# Patient Record
Sex: FEMALE | Race: WHITE | NOT HISPANIC OR LATINO
[De-identification: names, ages, dates, MRNs, and addresses within clinical notes are randomized per-mention and may not be internally consistent; named-entity substitution may affect disease eponyms.]

---

## 2017-06-10 ENCOUNTER — HEALTH MAINTENANCE LETTER (OUTPATIENT)
Age: 27
End: 2017-06-10

## 2022-03-03 ENCOUNTER — OFFICE VISIT (OUTPATIENT)
Dept: VASCULAR SURGERY | Facility: CLINIC | Age: 32
End: 2022-03-03
Payer: COMMERCIAL

## 2022-03-03 DIAGNOSIS — L29.9 PRURITUS OF SKIN: Primary | ICD-10-CM

## 2022-03-03 PROCEDURE — 99203 OFFICE O/P NEW LOW 30 MIN: CPT | Performed by: SURGERY

## 2022-03-03 RX ORDER — PROPRANOLOL HYDROCHLORIDE 10 MG/1
TABLET ORAL
COMMUNITY
Start: 2021-10-28

## 2022-03-03 RX ORDER — FLUVOXAMINE MALEATE 100 MG
TABLET ORAL
COMMUNITY
Start: 2021-10-27

## 2022-03-03 RX ORDER — CEFUROXIME AXETIL 250 MG/1
TABLET ORAL
COMMUNITY
Start: 2021-11-26

## 2022-03-03 RX ORDER — SUMATRIPTAN 50 MG/1
TABLET, FILM COATED ORAL
COMMUNITY

## 2022-03-03 RX ORDER — RISPERIDONE 1 MG/1
TABLET ORAL
COMMUNITY
Start: 2022-01-28

## 2022-03-03 RX ORDER — ONDANSETRON 8 MG/1
TABLET, ORALLY DISINTEGRATING ORAL
COMMUNITY
Start: 2021-04-21

## 2022-03-03 NOTE — PROGRESS NOTES
VEINSOLUTIONS CONSULTATION    HPI:    Chantal Ricardo is a pleasant 31 year old female referred by Dr. Dm Gaspar for evaluation of bilateral pruritus and occasional pain.  She is noted changes in her proximal medial thighs which she thought were telangiectasias.  She has to wear compression shorts deep to her pants because her thighs rub together causing more discomfort.  She has not noted a rash or any eschars of her skin.  She denies trauma, superficial thrombophlebitis or hemorrhage.    She does not admit to any swelling of her ankles.  She does admit that she has gained about 20 pounds over the last 2 years during the Covid pandemic.  Prior to this time she was involved in [employer] and was quite physically active but has been much less active during the pandemic.    She has not worn compression hose to any significant extent.  Her family history is significant for telangiectasias in her father who has had multiple episodes of hemorrhage.  Her mother suffered an apparent deep vein thrombosis with a pulmonary embolism in January 2020 while receiving chemotherapy for metastatic breast cancer.  She is not aware of any other family members having venous thromboembolism.    PAST MEDICAL HISTORY: No past medical history on file.    PAST SURGICAL HISTORY: No past surgical history on file.    FAMILY HISTORY: No family history on file.    SOCIAL HISTORY:   Social History     Tobacco Use     Smoking status: Never Smoker     Smokeless tobacco: Not on file   Substance Use Topics     Alcohol use: Not on file       REVIEW OF SYSTEMS: Review Of Systems  Skin: itching  Eyes: negative  Ears/Nose/Throat: sinus trouble  Respiratory: Dry cough on and off for 2 years  Cardiovascular: negative  Gastrointestinal: negative  Genitourinary: negative  Musculoskeletal: Back pain, secondary to weight gain  Neurologic: migraine headaches  Psychiatric: anxiety  Hematologic/Lymphatic/Immunologic: Bruises significantly if she falls;  rare epistaxis if the area is dry  Endocrine: Prediabetes      Vital signs:  There were no vitals taken for this visit.    Current Outpatient Medications   Medication Sig Dispense Refill     fluvoxaMINE (LUVOX) 100 MG tablet fluvoxamine 100 mg tablet   TAKE 3 TABLETS BY MOUTH AT BEDTIME       ondansetron (ZOFRAN-ODT) 8 MG ODT tab        propranolol (INDERAL) 10 MG tablet propranolol 10 mg tablet   TAKE 1 TABLET BY MOUTH TWICE DAILY       risperiDONE (RISPERDAL) 1 MG tablet risperidone 1 mg tablet   TAKE 1 TABLET BY MOUTH EVERY EVENING AT BEDTIME       SUMAtriptan (IMITREX STATDOSE) 6 MG/0.5ML pen injector kit INJECT 0.5 ML AT ONSET OF HEADACHE. MAY REPEAT IN 2 HOURS WITH MAX 2 INJECTIONS PER DAY. NO MORE THAN TWICE WEEKLY.       POLYTRIM 66771-1.1 UNIT/ML-% OP SOLN 1 drop to right eye tid one 0     SUMAtriptan (IMITREX) 50 MG tablet sumatriptan 50 mg tablet   TK 1 T PO ONSET OF HA MAY REPEAT Q 2 H PRN. MAX 4 TS PER 24 H       ZYRTEC 10 MG OR TABS one qd  30 2       PHYSICAL EXAM:  General: Pleasant, NAD.   HEENT: Normocephalic, atraumatic, external ears and nose normal.   Respiratory: Normal respiratory effort.   Cardiovascular: Pulse is regular.   Musculoskeletal: Gait and station normal.  The joints of her fingers and toes without deformity.  There is no cyanosis of her nailbeds.   EXTREMITIES: Right lower extremity: No varicose veins.  She has slightly reddish-brown, linear marks on her skin in her right proximal medial thigh extending down the medial thigh to the distal third of the thigh.  No ankle edema or stasis changes.    Left lower extremity: Mirror-image findings on the left proximal medial thigh extending down the medial thigh with reddish-brown linear areas on the skin which appear slightly irritated.    I inspected these areas on her inner thighs bilaterally after donning a Syris headlight with magnification and note that these actually represent stretch marks.  These are not venous structures.     PULSES: R/L (3=normal pulse, 0=no palpable pulse) dorsalis pedis: 3/3; posterior tibial: 3/3.      Neurologic: Grossly normal  Psychiatric: Mood, affect, judgment and insight are normal     ASSESSMENT:  The areas of concern on her proximal medial thighs appear to represent stretch marks.  These do appear slightly irritated.  I discussed the anatomy of the lower extremity veins and the pathophysiology of venous insufficiency with the patient and her mother.    If these areas are pruritic, I recommended she obtain cortisone cream and apply small amount of this to the areas twice daily for 1week to see if this would help with the irritation.  Also recommend that she use a good moisturizer such as Vanicream on the scan.    She feels that these have become worse in the last year and a half which may be related to weight gain from decreasing physical activities.  I strongly encouraged her to try to walk or get exercise on a daily basis.  She has been encouraged to walk at least 5000 steps daily by other physicians and I echoed this encouragement.  Both the patient and her mother voiced understanding and their questions were answered.    PLAN:  Cortisone cream twice daily to areas of pruritus for 1week and Vanicream to keep skin moisturized.  If this does not help with her pruritus, would recommend dermatology appointment.     Edenilson Malone MD    Dictated using Dragon voice recognition software which may result in transcription errors

## 2022-03-03 NOTE — NURSING NOTE
Patient Reported symptoms:    Right leg   Heaviness None of the time   Achiness None of the time   Swelling None of the time   Throbbing None of the time   Itching ALL OF THE TIME  Appearance Slightly noticeable   Impact on work/activities Symptoms but full able to participate    Left Leg   Heaviness None of the time   Achiness None of the time   Swelling None of the time   Throbbing None of the time   Itching All of the time  Appearance Not at all noticeable   Impact on work/activities Symptoms but full able to participate

## 2023-03-23 ENCOUNTER — LAB REQUISITION (OUTPATIENT)
Dept: LAB | Facility: CLINIC | Age: 33
End: 2023-03-23

## 2023-03-23 DIAGNOSIS — R30.0 DYSURIA: ICD-10-CM

## 2023-03-23 PROCEDURE — 87086 URINE CULTURE/COLONY COUNT: CPT | Performed by: OBSTETRICS & GYNECOLOGY

## 2023-03-25 LAB — BACTERIA UR CULT: NORMAL

## 2024-03-07 ENCOUNTER — PATIENT OUTREACH (OUTPATIENT)
Dept: ONCOLOGY | Facility: CLINIC | Age: 34
End: 2024-03-07
Payer: COMMERCIAL

## 2024-03-07 ENCOUNTER — TRANSCRIBE ORDERS (OUTPATIENT)
Dept: OTHER | Age: 34
End: 2024-03-07

## 2024-03-07 ENCOUNTER — LAB REQUISITION (OUTPATIENT)
Dept: LAB | Facility: CLINIC | Age: 34
End: 2024-03-07
Payer: COMMERCIAL

## 2024-03-07 DIAGNOSIS — Z80.9 FAMILY HISTORY OF MALIGNANT NEOPLASM, UNSPECIFIED: Primary | ICD-10-CM

## 2024-03-07 DIAGNOSIS — Z01.419 ENCOUNTER FOR GYNECOLOGICAL EXAMINATION (GENERAL) (ROUTINE) WITHOUT ABNORMAL FINDINGS: ICD-10-CM

## 2024-03-07 PROCEDURE — 87624 HPV HI-RISK TYP POOLED RSLT: CPT | Mod: ORL | Performed by: OBSTETRICS & GYNECOLOGY

## 2024-03-07 PROCEDURE — G0123 SCREEN CERV/VAG THIN LAYER: HCPCS | Mod: ORL | Performed by: OBSTETRICS & GYNECOLOGY

## 2024-03-07 NOTE — PROGRESS NOTES
Writer received referral to Cancer Risk Management/Genetic Counseling.    Referred for:     Family history of malignant neoplasm, unspecified        Referral reviewed for appropriate plan, and sent to New Patient Scheduling ([phone]) for completion.    Loly Caba RN, BSN  Oncology New Patient Nurse Navigator   [facility]  [phone]

## 2024-03-12 LAB
BKR LAB AP GYN ADEQUACY: NORMAL
BKR LAB AP GYN INTERPRETATION: NORMAL
BKR LAB AP HPV REFLEX: NORMAL
BKR LAB AP LMP: NORMAL
BKR LAB AP PREVIOUS ABNL DX: NORMAL
BKR LAB AP PREVIOUS ABNORMAL: NORMAL
PATH REPORT.COMMENTS IMP SPEC: NORMAL
PATH REPORT.COMMENTS IMP SPEC: NORMAL
PATH REPORT.RELEVANT HX SPEC: NORMAL

## 2024-03-14 LAB
HUMAN PAPILLOMA VIRUS 16 DNA: NEGATIVE
HUMAN PAPILLOMA VIRUS 18 DNA: NEGATIVE
HUMAN PAPILLOMA VIRUS FINAL DIAGNOSIS: NORMAL
HUMAN PAPILLOMA VIRUS OTHER HR: NEGATIVE

## 2024-03-17 ENCOUNTER — HEALTH MAINTENANCE LETTER (OUTPATIENT)
Age: 34
End: 2024-03-17

## 2024-05-01 ENCOUNTER — VIRTUAL VISIT (OUTPATIENT)
Dept: ONCOLOGY | Facility: CLINIC | Age: 34
End: 2024-05-01
Attending: OBSTETRICS & GYNECOLOGY
Payer: COMMERCIAL

## 2024-05-01 DIAGNOSIS — Z80.8 FAMILY HISTORY OF BRAIN CANCER: ICD-10-CM

## 2024-05-01 DIAGNOSIS — Z80.3 FAMILY HISTORY OF BREAST CANCER: Primary | ICD-10-CM

## 2024-05-01 DIAGNOSIS — Z80.41 FAMILY HISTORY OF MALIGNANT NEOPLASM OF OVARY: ICD-10-CM

## 2024-05-01 DIAGNOSIS — Z80.0 FAMILY HISTORY OF CANCER OF GALLBLADDER: ICD-10-CM

## 2024-05-01 PROCEDURE — 96040 HC GENETIC COUNSELING, EACH 30 MINUTES: CPT | Mod: TEL,95

## 2024-05-01 NOTE — Clinical Note
5/1/2024         RE: Chantal Ricardo  [address] [place] [address]        Dear Colleague,    Thank you for referring your patient, Chantal Ricardo, to the [facility]. Please see a copy of my visit note below.    Virtual Visit Details  Type of service:  Telephone Visit   Originating Location (pt. Location): Home  Distant Location (provider location):  Off-site    5/1/2024    Referring Provider: Az Puckett MD     Presenting Information:   I met with Chantal Ricardo today for her telephone genetic counseling visit through the Cancer Risk Management Program to discuss her family history of breast ad ovarian cancer. She is here today to review this history, cancer screening recommendations, and available genetic testing options.    Personal History:  Chantal is a 34 year old female. She does not have any personal history of cancer. In her hormonal-based medical history, she had her first menstrual period at age 14, does not have biological children, and is premenopausal. Chantal has her ovaries, fallopian tubes and uterus in place, and reports no ovarian cancer screening to date. She reports no history of hormone replacement therapy and oral contraceptive use. She has not had a mammogram or colonoscopy. Chantal reports a history of dermatological exams. She does not regularly do any other cancer screening at this time. Chantal reported no history of nicotine or tobacco use and occasional alcohol use.    Family History: (Please see scanned pedigree for detailed family history information)  Chantal's mother was diagnosed with breast cancer (HER2+) at age 56. Treatment included chemotherapy and radiation therapy. She was found to have metastatic recurrence at age 65 and passed away shortly after. It was reported that she did not have genetic testing.  Maternal aunt was diagnosed with brain cancer in her early 40's. She passed away at age 43.   Another maternal aunt  was diagnosed with cancer (potentially melanoma). She is currently in her 60's.   Maternal grandmother was diagnosed with metastatic breast cancer (HER2-) at age 79. She passed away at age 80.   Her sister (Chantal's great aunt) was diagnosed with cancer (unknown type).   Paternal aunt was diagnosed with ovarian cancer in her 60's. It was reported that she had negative genetic testing approximately 2-3 years ago. However, a copy of her report was unavailable for review today. She is currently in her mid/late 60's.   Paternal grandmother was diagnosed with gallbladder cancer at age 92 and passed away shortly after.   It is unclear if her paternal grandfather was diagnosed with cancer. He is currently 95.   Her maternal and paternal ethnicity is [place], [place], and [place], and . There is no known Ashkenazi Nondenominational ancestry on either side of her family. There is no reported consanguinity.    Discussion:  Chantal's family history of ovarian cancer is suggestive of a hereditary cancer syndrome.  We briefly reviewed basic genetics principles. Many of the genes we are born with impact our risk of certain diseases, such as cancer.  When one of these genes is not working properly due to a mistake (known as a  mutation  or  variant ), this may lead to an increased risk of developing cancer.   We reviewed the features of sporadic, familial, and hereditary cancers.   The vast majority of cancers are considered sporadic and not primarily due to an inherited factor. Individuals can develop cancer due to aging, chance events, environmental exposures or lifestyles.  Approximately 5-10% of cancer diagnoses are thought to be caused by inheriting a mutation or variant within a single cancer susceptibility gene. Features typically seen in these high risk families include: cancers diagnosed under age 50, multiple relatives with similar cancers on the same side of the family, cancers in multiple generations, and relatives with  certain rare cancers (i.e., male breast cancer).   We discussed the natural history and genetics of several hereditary cancer syndromes, including Hereditary Breast and Ovarian Cancer Syndrome (HBOC).   The most common cause of hereditary breast and ovarian cancer is HBOC, which is caused by mutations in the BRCA1 and BRCA2 genes. Individuals with HBOC syndrome are at increased risk for several different cancers including: female and male breast cancer, ovarian cancer, prostate cancer, melanoma, and pancreatic cancer. HBOC typically presents with multiple family members diagnosed with breast cancer before age 50 and/or ovarian cancer.   A detailed handout regarding information about HBOC and related hereditary cancer syndromes will be provided to Chantal via [facility] and can be found in the after visit summary. Topics included: inheritance pattern, cancer risks, cancer screening recommendations, and also risks, benefits and limitations of testing.  In looking at Chantal's personal and family history, it is possible that a cancer susceptibility gene is present due to her paternal aunt diagnosed with ovarian cancer in her 60's.  Chantal reported that her aunt likely had negative genetic testing within the last 2-3 years. However, a copy of her report was unavailable for review today. Of note, Chantal meeting National Comprehensive Cancer Network guidelines for genes associated with ovarian cancer is dependent on her paternal aunt's genetic testing results.   We discussed the importance of obtaining a copy of her paternal aunt's genetic testing results for review.   There are three possible results of any genetic test: Positive meaning a harmful mutation was identified, variant of unknown significance (VUS) meaning a variation in one of the genes was identified but it is unclear how this impacts cancer risk in the family, and negative meaning no mutations were identified. Positive results are medically  actionable and medical management and screening decisions are made based on these results. However, because it is unclear what, if any, risk is associated with VUS results, medical management decisions are NOT made based on this result alone.   Given her paternal aunt reportedly had negative genetic testing, we discussed reviewing these results would aid in genetic testing decisions and provide a more comprehensive understanding of how to effectively manage Chantal's cancer risk. If her paternal aunt's results were negative, then genetic testing for Chantal may not be indicated, given the current family history.   As a copy of Chantal paternal aunt's genetic testing report is needed to determine if Chantal qualifies for genetic testing, no orders were placed today. Chantal verbalized understanding and stated that she planned to send me a copy via [employer] or email of her paternal aunt's test results. Once I have reviewed the results, we can discuss the impact of that information on her in more detail.  After our discussion, Chantal decided she wanted to think more about the information we discussed before deciding whether to pursue genetic testing. Chantal plans to reach out to me if she decides to proceed with testing in which case we would set up another appointment to discuss testing logistics.   Screening recommendations based upon personal/family history:     Chantal is likely at an increased risk for breast cancer given her family history. Breast cancer screening is generally recommended to begin approximately 10 years younger than the earliest age of breast cancer diagnosis in the family, or at age 40, whichever comes first. In this family, screening may begin at age 40. Breast cancer risk models can been used to try to estimate a woman's lifetime risk to develop breast cancer in order to determine which women should consider increased breast cancer surveillance. Once Chantal is approaching  the age of 41, she is encouraged to reach back out so we can run these models to better assess her breast cancer screening recommendations. Chantal may also discuss breast cancer surveillance recommendations with her primary care provider at that time.   Due to Chantal's family history of ovarian cancer, Chantal and other close female relatives of the family member who had ovarian cancer remain at slightly increased risk for ovarian cancer. We discussed available ovarian cancer screening (pelvic exams, CA-125 blood tests, and transvaginal ultrasounds) as well as the significant limitations of this screening. As such, this screening is not typically recommended. That being said, women in this family should discuss this screening and the signs and symptoms of ovarian cancer with their primary OB/GYN provider, as they may have individualized recommendations.  Other population cancer screening options, such as those recommended by the American Cancer Society and the National Comprehensive Cancer Network (NCCN), are also appropriate for Chantal and her family. These screening recommendations may change if there are changes to Chantal's personal and/or family history of cancer.  Final screening recommendations should be made by each individual's managing physician.   Of note, these screening recommendations may change should Chantal elect to pursue genetic testing in the future.  Medical Management: If Chantal were to proceed with genetic testing in the future it may determine:  additional cancer screening for which Chantal may qualify (i.e. mammogram and breast MRI, more frequent colonoscopies, more frequent dermatologic exams, etc.),  options for risk reducing surgeries Chantal could consider (i.e. bilateral mastectomy, surgery to remove her ovaries and/or uterus, etc.),    and targeted chemotherapies if she were to develop certain cancers in the future (i.e. immunotherapy for individuals with Toro  syndrome, PARP inhibitors, etc.).   These recommendations and possible targeted chemotherapies would be discussed in detail if Chantal decided to purse genetic testing.     Plan  1) After today's discussion, Chantal decided to think about the information we discussed and about whether or not she wants to pursue genetic testing. Chantal also planned to send me a copy of her paternal aunt's genetic test report. Once I have reviewed these results, we could discuss the impact of this information on her in more detail. Therefore, no genetic testing was ordered today.   2) Chantal plans to reach out to me if she decides to purse with genetic testing. If this is the case, we will schedule an appointment to review testing options, logistics surrounding testing, and review the consent form. My contact information was provided.   3) Information regarding recommended screening, based upon the family history, was reviewed for Chantal.  4) Chantal will contact me regularly and/or if the family or personal history changes.    Chantal is 34 year old and is being evaluated via a billable telephone visit.    Time spent on phone: 52 minutes    Janae Brtio MS, [facility] [place]  Licensed, Certified Genetic Counselor  Phone: [phone]        Again, thank you for allowing me to participate in the care of your patient.        Sincerely,        Janae Brito GC

## 2024-05-01 NOTE — LETTER
Cancer Risk Management  Program Locations    [facility]  [facility]  [facility]  [facility]  [facility]  Mailing Address  Cancer Risk Management Program  [facility]  [address]  [place] [address]    New patient appointments  [phone]    May 1, 2024    [place]al [place] [address]    Dear Chantal,    It was a pleasure talking with you via your virtual genetic counseling visit on 5/1/2024.  Below is a copy of the progress note from that recent visit through the Cancer Risk Management Program.  If you have any additional questions, please feel free to contact me.    Referring Provider: Az Puckett MD     Presenting Information:   I met with Chantal Davion today for her telephone genetic counseling visit through the Cancer Risk Management Program to discuss her family history of breast ad ovarian cancer. She is here today to review this history, cancer screening recommendations, and available genetic testing options.    Personal History:  Chantal is a 34 year old female. She does not have any personal history of cancer. In her hormonal-based medical history, she had her first menstrual period at age 14, does not have biological children, and is premenopausal. Chantal has her ovaries, fallopian tubes and uterus in place, and reports no ovarian cancer screening to date. She reports no history of hormone replacement therapy and oral contraceptive use. She has not had a mammogram or colonoscopy. Chantal reports a history of dermatological exams. She does not regularly do any other cancer screening at this time. Chantal reported no history of nicotine or tobacco use and occasional alcohol use.    Family History: (Please see scanned pedigree for detailed family history information)  Chantal's mother was diagnosed with  breast cancer (HER2+) at age 56. Treatment included chemotherapy and radiation therapy. She was found to have metastatic recurrence at age 65 and passed away shortly after. It was reported that she did not have genetic testing.  Maternal aunt was diagnosed with brain cancer in her early 40's. She passed away at age 43.   Another maternal aunt was diagnosed with cancer (potentially melanoma). She is currently in her 60's.   Maternal grandmother was diagnosed with metastatic breast cancer (HER2-) at age 79. She passed away at age 80.   Her sister (Chantal's great aunt) was diagnosed with cancer (unknown type).   Paternal aunt was diagnosed with ovarian cancer in her 60's. It was reported that she had negative genetic testing approximately 2-3 years ago. However, a copy of her report was unavailable for review today. She is currently in her mid/late 60's.   Paternal grandmother was diagnosed with gallbladder cancer at age 92 and passed away shortly after.   It is unclear if her paternal grandfather was diagnosed with cancer. He is currently 95.   Her maternal and paternal ethnicity is [place], [place], and Samoan, and . There is no known Ashkenazi Rastafarian ancestry on either side of her family. There is no reported consanguinity.    Discussion:  Chantal's family history of ovarian cancer is suggestive of a hereditary cancer syndrome.  We briefly reviewed basic genetics principles. Many of the genes we are born with impact our risk of certain diseases, such as cancer.  When one of these genes is not working properly due to a mistake (known as a  mutation  or  variant ), this may lead to an increased risk of developing cancer.   We reviewed the features of sporadic, familial, and hereditary cancers.   The vast majority of cancers are considered sporadic and not primarily due to an inherited factor. Individuals can develop cancer due to aging, chance events, environmental exposures or lifestyles.  Approximately  5-10% of cancer diagnoses are thought to be caused by inheriting a mutation or variant within a single cancer susceptibility gene. Features typically seen in these high risk families include: cancers diagnosed under age 50, multiple relatives with similar cancers on the same side of the family, cancers in multiple generations, and relatives with certain rare cancers (i.e., male breast cancer).   We discussed the natural history and genetics of several hereditary cancer syndromes, including Hereditary Breast and Ovarian Cancer Syndrome (HBOC).   The most common cause of hereditary breast and ovarian cancer is HBOC, which is caused by mutations in the BRCA1 and BRCA2 genes. Individuals with HBOC syndrome are at increased risk for several different cancers including: female and male breast cancer, ovarian cancer, prostate cancer, melanoma, and pancreatic cancer. HBOC typically presents with multiple family members diagnosed with breast cancer before age 50 and/or ovarian cancer.   A detailed handout regarding information about HBOC and related hereditary cancer syndromes will be provided to Chantal via [employer] and can be found in the after visit summary. Topics included: inheritance pattern, cancer risks, cancer screening recommendations, and also risks, benefits and limitations of testing.  In looking at Chantal's personal and family history, it is possible that a cancer susceptibility gene is present due to her paternal aunt diagnosed with ovarian cancer in her 60's.  Chantal reported that her aunt likely had negative genetic testing within the last 2-3 years. However, a copy of her report was unavailable for review today. Of note, Chantal meeting National Comprehensive Cancer Network guidelines for genes associated with ovarian cancer is dependent on her paternal aunt's genetic testing results.   We discussed the importance of obtaining a copy of her paternal aunt's genetic testing results for review.    There are three possible results of any genetic test: Positive meaning a harmful mutation was identified, variant of unknown significance (VUS) meaning a variation in one of the genes was identified but it is unclear how this impacts cancer risk in the family, and negative meaning no mutations were identified. Positive results are medically actionable and medical management and screening decisions are made based on these results. However, because it is unclear what, if any, risk is associated with VUS results, medical management decisions are NOT made based on this result alone.   Given her paternal aunt reportedly had negative genetic testing, we discussed reviewing these results would aid in genetic testing decisions and provide a more comprehensive understanding of how to effectively manage Chantal's cancer risk. If her paternal aunt's results were negative, then genetic testing for Chantal may not be indicated, given the current family history.   As a copy of Chantal paternal aunt's genetic testing report is needed to determine if Chantal qualifies for genetic testing, no orders were placed today. Chantal verbalized understanding and stated that she planned to send me a copy via [employer] or email of her paternal aunt's test results. Once I have reviewed the results, we can discuss the impact of that information on her in more detail.  After our discussion, Chantal decided she wanted to think more about the information we discussed before deciding whether to pursue genetic testing. Chantal plans to reach out to me if she decides to proceed with testing in which case we would set up another appointment to discuss testing logistics.   Screening recommendations based upon personal/family history:     Chantal is likely at an increased risk for breast cancer given her family history. Breast cancer screening is generally recommended to begin approximately 10 years younger than the earliest age of  breast cancer diagnosis in the family, or at age 40, whichever comes first. In this family, screening may begin at age 40. Breast cancer risk models can been used to try to estimate a woman's lifetime risk to develop breast cancer in order to determine which women should consider increased breast cancer surveillance. Once Chantal is approaching the age of 41, she is encouraged to reach back out so we can run these models to better assess her breast cancer screening recommendations. Chantal may also discuss breast cancer surveillance recommendations with her primary care provider at that time.   Due to Chantal's family history of ovarian cancer, Chantal and other close female relatives of the family member who had ovarian cancer remain at slightly increased risk for ovarian cancer. We discussed available ovarian cancer screening (pelvic exams, CA-125 blood tests, and transvaginal ultrasounds) as well as the significant limitations of this screening. As such, this screening is not typically recommended. That being said, women in this family should discuss this screening and the signs and symptoms of ovarian cancer with their primary OB/GYN provider, as they may have individualized recommendations.  Other population cancer screening options, such as those recommended by the American Cancer Society and the National Comprehensive Cancer Network (NCCN), are also appropriate for Chantal and her family. These screening recommendations may change if there are changes to Chantal's personal and/or family history of cancer.  Final screening recommendations should be made by each individual's managing physician.   Of note, these screening recommendations may change should Chantal elect to pursue genetic testing in the future.  Medical Management: If Chantal were to proceed with genetic testing in the future it may determine:  additional cancer screening for which Chantal may qualify (i.e. mammogram and breast  MRI, more frequent colonoscopies, more frequent dermatologic exams, etc.),  options for risk reducing surgeries Chantal could consider (i.e. bilateral mastectomy, surgery to remove her ovaries and/or uterus, etc.),    and targeted chemotherapies if she were to develop certain cancers in the future (i.e. immunotherapy for individuals with Toro syndrome, PARP inhibitors, etc.).   These recommendations and possible targeted chemotherapies would be discussed in detail if Chantal decided to purse genetic testing.     Plan  1) After today's discussion, Chantal decided to think about the information we discussed and about whether or not she wants to pursue genetic testing. Chantal also planned to send me a copy of her paternal aunt's genetic test report. Once I have reviewed these results, we could discuss the impact of this information on her in more detail. Therefore, no genetic testing was ordered today.   2) Chantal plans to reach out to me if she decides to purse with genetic testing. If this is the case, we will schedule an appointment to review testing options, logistics surrounding testing, and review the consent form. My contact information was provided.   3) Information regarding recommended screening, based upon the family history, was reviewed for Chantal.  4) Chantal will contact me regularly and/or if the family or personal history changes.    Chantal is 34 year old and is being evaluated via a billable telephone visit.    Time spent on phone: 52 minutes    Janae Brito MS, [facility]  Licensed, Certified Genetic Counselor  Phone: [phone]

## 2024-05-01 NOTE — NURSING NOTE
Is the patient currently in the state of [place]? YES    Visit mode:TELEPHONE    If the visit is dropped, the patient can be reconnected by: TELEPHONE VISIT: Phone number: [phone]    Will anyone else be joining the visit? NO  (If patient encounters technical issues they should call [phone]    How would you like to obtain your AVS? MyChart    Are changes needed to the allergy or medication list? No    Are refills needed on medications prescribed by this physician? NO    Reason for visit: Consult    Ismael ROBERTS

## 2024-05-01 NOTE — PATIENT INSTRUCTIONS
Assessing Cancer Risk  Cancer is a common diagnosis which impacts many families.  Individuals may develop cancer due to environmental factors (such as exposures and lifestyle), aging, genetic predisposition, or a combination of these factors.      Only about 5-10% of cancers are thought to be due to an inherited cancer susceptibility gene.    These families often have:  Several people with the same or related types of cancer  Cancers diagnosed at a young age (before age 50)  Individuals with more than one primary cancer  Multiple generations of the family affected with cancer    Comprehensive Breast and Gynecologic Cancer Panel  We each inherit two copies of every gene in our bodies: one from our mother, and one from our father. Each gene has a specific job to do.  When a gene has a mistake or  mutation  in it, it does not work like it should.     Some people may be candidates for genetic testing of more than one gene.  For these families, genetic testing using a cancer panel may be offered. These panels will test different genes at once known to increase the risk for breast, ovarian, uterine, and/or other cancers.    This handout will review common hereditary breast and gynecologic cancer syndromes. The genes that will be discussed in this handout are: TRACY, BRCA1, BRCA2, BRIP1, CDH1, CHEK2, MLH1, MSH2, MSH6, PMS2, EPCAM, PTEN, PALB2, RAD51C, RAD51D, and TP53.    The purpose of this handout is to serve as a brief summary of the breast and gynecologic cancer risk genes that have published clinical management guidelines for individuals who are found to carry a mutation. Inheriting a mutation does not mean a person will develop cancer, but it does significantly increase their risk above the general population risk.     ______________________________________________________________________________    Hereditary Breast and Ovarian Cancer Syndrome (BRCA1 and BRCA2)  A single mutation in one of the copies of BRCA1 or  BRCA2 increases the risk for breast and ovarian cancer, among others.  The risk for pancreatic cancer and melanoma may also be slightly increased in some families.  The chart below shows the chance that someone with a BRCA mutation would develop cancer in his or her lifetime1,2,3,4.       Lifetime Cancer Risks    General Population BRCA1  BRCA2   Breast  12% >60% >60%   Ovarian  1-2% 39-58% 13-29%   Prostate 12% 7-26% 19-61%   Male Breast 0.1% 0.2-1.2% 1.8-7.1%   Pancreas 1-2% Up to 5% 5-10%     A person s ethnic background is also important to consider, as individuals of Ashkenazi [place] ancestry have a higher chance of having a BRCA gene mutation.  There are three BRCA mutations that occur more frequently in this population.      Toro Syndrome (MLH1, MSH2, MSH6, PMS2, and EPCAM)  Currently five genes are known to cause Toro Syndrome: MLH1, MSH2, MSH6, PMS2, and EPCAM.  A single mutation in one of the Toro Syndrome genes increases the risk for colon, endometrial, ovarian, and stomach cancers.  Other cancers that occur less commonly in Toro Syndrome include urinary tract, skin, and brain cancers.  The chart below shows the chance that a person with Toro syndrome would develop cancer in his or her lifetime5.      Lifetime Cancer Risks    General Population Toro Syndrome   Colon 5% 10-61%   Endometrial 3% 13-57%   Ovarian 1-2% 1-38%   Stomach <1% 1-9%   *Cancer risk varies depending on Toro syndrome gene found      Cowden Syndrome (PTEN)  Cowden syndrome is a hereditary condition that increases the risk for breast, thyroid, endometrial, colon, and kidney cancer.  Cowden syndrome is caused by a mutation in the PTEN gene.  A single mutation in one of the copies of PTEN causes Cowden syndrome and increases cancer risk.  The chart below shows the chance that someone with a PTEN mutation would develop cancer in their lifetime6,7.  Other benign features seen in some individuals with Cowden syndrome include benign  skin lesions (facial papules, keratoses, lipomas), learning disability, autism, thyroid nodules, colon polyps, and larger head size.     Lifetime Cancer Risks    General Population Cowden   Breast 12% 40-60%*   Thyroid 1% Up to 38%   Renal 1-2% Up to 35%   Endometrial 3% Up to 28%   Colon 5% Up to 9%   Melanoma 2-3% Up to 6%   *Emerging data suggests the risk for breast cancer could be greater than 60%               Li-Fraumeni Syndrome (TP53)  Li-Fraumeni Syndrome (LFS) is a cancer predisposition syndrome caused by a mutation in the TP53 gene. A single mutation in one of the copies of TP53 increases the risk for multiple cancers. Individuals with LFS are at an increased risk for developing cancer at a young age. The lifetime risk for development of a LFS-associated cancer is 50% by age 30 and 90% by age 60.   Core Cancers: Sarcomas, Breast, Brain, Lung, Leukemias/Lymphomas, Adrenocortical carcinomas  Other Cancers: Gastrointestinal, Thyroid, Skin, Genitourinary       Hereditary Diffuse Gastric Cancer (CDH1)  Currently, one gene is known to cause hereditary diffuse gastric cancer (HDGC): CDH1.  Individuals with HDGC are at increased risk for diffuse gastric cancer and lobular breast cancer. Of people diagnosed with HDGC, 30-50% have a mutation in the CDH1 gene.  This suggests there are likely other genes that may cause HDGC that have not been identified yet.      Lifetime Cancer Risks    General Population HDGC   Diffuse Gastric  <1% ~80%   Breast 12% 41-60%       Additional Genes    TRACY  TRACY is a moderate-risk breast cancer gene. Women who have a mutation in TRACY can have between a 2-4 fold increased risk for breast cancer compared to the general population8. TRACY mutations have also been associated with increased risk for pancreatic cancer between 5-10%9. Individuals who inherit two TRACY mutations have a condition called ataxia-telangiectasia (AT).  This rare autosomal recessive condition affects the nervous system  and immune system, and is associated with progressive cerebellar ataxia beginning in childhood. Individuals with ataxia-telangiectasia often have a weakened immune system and have an increased risk for childhood cancers.    PALB2  Mutations in PALB2 have been shown to increase the risk of breast cancer up to 41-60% in some families; where individuals fall within this risk range is dependent upon family [id number]. PALB2 mutations have also been associated with increased risk for pancreatic cancer between 5-10%.  Individuals who inherit two PALB2 mutations--one from their mother and one from their father--have a condition called Fanconi Anemia.  This rare autosomal recessive condition is associated with short stature, developmental delay, bone marrow failure, and increased risk for childhood cancers.    CHEK2   CHEK2 is a moderate-risk breast cancer gene.  Women who have a mutation in CHEK2 have around a 2-4 fold increased risk for breast cancer compared to the general population, and this risk may be higher depending upon family history.11,12,13 The risk of colon cancer may be twice as high as the general population risk of colon cancer of 5%. Mutations in CHEK2 have also been shown to increase the risk of other cancers, including prostate, however these cancer risks are currently not well understood.    BRIP1, RAD51C and RAD51D  Mutations in RAD51C and RAD51D have been shown to increase the risk of ovarian cancer and breast cancer 14,. Mutations in BRIP1 have been shown to increase the risk of ovarian cancer and possibly female breast cancer 15 .       Lifetime Cancer Risk    General Population        BRIP1   RAD51C  RAD51D   Breast 12% Not well defined 20-40% 20-40%   Ovarian 1-2% 5-15% 10-15% 10-20%     ______________________________________________________________  Inheritance  All of the cancer syndromes reviewed above are inherited in an autosomal dominant pattern.  This means that if a parent has a mutation,  each of their children will have a 50% chance of inheriting that same mutation. Therefore, each child --male or female-- would have a 50% chance of being at increased risk for developing cancer.    Image obtained from Genetics Home Reference, 2013     Mutations in some genes can occur de meri, which means that a person s mutation occurred for the first time in them and was not inherited from a parent.  Now that they have the mutation, however, it can be passed on to future generations.    Genetic Testing  Genetic testing involves a blood test and will look for any harmful mutations that are associated with increased cancer risk.  If possible, it is recommended that the person(s) who has had cancer be tested before other family members.  That person will give us the most useful information about whether or not a specific gene is associated with the cancer in the family.    Results  There are three possible results of genetic testing:  Positive--a harmful mutation was identified in one or more of the genes  Negative--no mutations were identified in any of the genes tested  Variant of unknown significance--a variation in one of the genes was identified, but it is unclear how this impacts cancer risk in the family    Advantages and Disadvantages   There are advantages and disadvantages to genetic testing.    Advantages  May clarify your cancer risk  Can help you make medical decisions  May explain the cancers in your family  May give useful information to your family members (if you share your results)    Disadvantages  Possible negative emotional impact of learning about inherited cancer risk  Uncertainty in interpreting a negative test result in some situations  Possible genetic discrimination concerns (see below)    Genetic Information Nondiscrimination Act (NIKO)  The Genetic Information Nondiscrimination Act of 2008 (NIKO) is a federal law that protects individuals from health insurance or employment discrimination  based on a genetic test result alone (with some exceptions, including employers with fewer than 15 employees, and ).  Although rare, NIKO  does not cover discrimination protections in terms of life insurance, long term care, or disability insurances.  Visit the National Human [employer] [place] website to learn more.    Reducing Cancer Risk  All of the genes described in this handout have nationally recognized cancer screening guidelines that would be recommended for individuals who test positive.  In addition to increased cancer screening, surgeries may be offered or recommended to reduce cancer risk.  Recommendations are based upon an individual s genetic test result as well as their personal and family history of cancer.    Questions to Think About Regarding Genetic Testing:  What effect will the test result have on me and my relationship with my family members if I have an inherited gene mutation?  If I don t have a gene mutation?  Should I share my test results, and how will my family react to this news, which may also affect them?  Are my children ready to learn new information that may one day affect their own health?    Hereditary Cancer Resources    FORCE: Facing Our Risk of Cancer Empowered facingourrisk.org   Bright Pink bebrightpink.org   Li-Fraumeni Syndrome Association lfsassociation.org   PTEN World PTENworld.com   No stomach for cancer, Inc. nostomachforcancer.org   Stomach cancer relief network Scrnet.org   Collaborative Group of the Americas on Inherited Colorectal Cancer (CGA) cgaicc.com    Cancer Care cancercare.org   American Cancer Society (ACS) cancer.org   National Cancer [place] (NCI) cancer.gov     Please call us if you have any questions or concerns.   Cancer Risk Management Program [phone]ANCER ([phone])  Rafal Capone, MS [facility] [place]  [phone]  Janae Brito, MS, [facility] [place] [phone]  Bushra Haley, MS, [facility] [place]  [phone]  Hortencia Ramirez, MS, [facility] [place]  [phone]  Dariana Be,  MS, [facility] [place]  [phone]  Jessica Loredo, MS, [facility] [place] [phone]  Lisa Griffin, MS, [facility] [phone]    References  Francisca Jones PDP, Bernice S, Vic CHAVEZ, Levi JE, Ismael JL, Maxine N, Eloy H, Ya O, Nadeem A, Pasini B, Radidenise P, Manmerry S, Josefa DM, Clark N, China E, Kaylynn H, Mathews E, Bob J, Gronshivam J, Cassandra B, Tulinius H, Thorlacius S, Eerola H, Nevanlinna H, Ba K, Herlinda OP. Average risks of breast and ovarian cancer associated with BRCA1 or BRCA2 mutations detected in case series unselected for family history: a combined analysis of 222 studies. Am J Hum Jessie. 2003;72:1117-30.  Danilo N, Ivelisse M, Cristina G.  BRCA1 and BRCA2 Hereditary Breast and Ovarian Cancer. Gene Reviews online. 2013.  Jeff YC, Marsha S, Guanakito G, Ramos S. Breast cancer risk among male BRCA1 and BRCA2 mutation carriers. J Natl Cancer Inst. 2007;99:1811-4.  Magnus GUTIÉRREZ, Angel I, Ralph J, Tyrell E, Maryann ER, Sara F. Risk of breast cancer in male BRCA2 carriers. J Med Jessie. 2010;47:710-1.  National Comprehensive Cancer Network. Clinical practice guidelines in oncology, colorectal cancer screening. Available online (registration required). 2015.  Sagar MH, Mikel J, Fely J, Romero MIN, Francisco MS, Eng C. Lifetime cancer risks in individuals with germline PTEN mutations. Clin Cancer Res. 2012;18:400-7.  Violeta R. Cowden Syndrome: A Critical Review of the Clinical Literature. J Jessie . 2009:18:13-27.  Sven HANEY, Venkat MELENDREZ, Dale S, Yissel P, Brisa T, Meggan M, Matteo B, Leia H, Elmo R, Duc K, Nicola L, Magnus GUTIÉRREZ, Josefa MELENDREZ, Jae DF, Susan MR, The Breast Cancer Susceptibility Collaboration ([place]) & Elmer ALBARRAN. TRACY mutations that cause ataxia-telangiectasia are breast cancer susceptibility alleles. Nature Genetics. 2006;38:873-875  Bairon N , Sakina Y, Shelli J, Ten L, Eduin JONES , Abdullahi ML, Brooke S, Hung AG, Lata S, Costa ML, Michelle J , Rene R, Dylan PETTY, Katerina  JR, Ethan VE, Nury M, Vomarikastein B, Thomas N, Dario RH, Sheron KW, and Karolina AP. TRACY mutations in patients with hereditary pancreatic cancer. Cancer Discover. 2012;2:41-46  Ashley GEORGE., et al. Breast-Cancer Risk in Families with Mutations in PALB2. NEJM. 2014; 371(6):497-506.  CHEK2 Breast Cancer Case-Control Consortium. CHEK2*1100delC and susceptibility to breast cancer: A collaborative analysis involving 10,860 breast cancer cases and 9,065 controls from 10 studies. Am J Hum Jessie, 74 (2004), pp. 2254-4380  Melissa T, Kameron S, Jazz K, et al. Spectrum of Mutations in BRCA1, BRCA2, CHEK2, and TP53 in Families at High Risk of Breast Cancer. NATALIA. 2006;295(12):0571-2284.   Taty C, Anjel D, Melly HANEY, et al. Risk of breast cancer in women with a CHEK2 mutation with and without a family history of breast cancer. J Clin Oncol. 2011;29:2740-8484.  Song H, Jss E, Ramus SJ, et al. Contribution of germline mutations in the RAD51B, RAD51C, and RAD51D genes to ovarian cancer in the population. J Clin Oncol. 2015;33(26):4667-4759. Doi:10.1200/JCO.2015.61.2408.  Ezequiel T, Dottie DF, Adriano P, et al. Mutations in BRIP1 confer high risk of ovarian cancer. Malou Jessie. 2011;43(11):0365-0137. doi:10.1038/ng.955.

## 2025-04-12 ENCOUNTER — HEALTH MAINTENANCE LETTER (OUTPATIENT)
Age: 35
End: 2025-04-12